# Patient Record
Sex: FEMALE | Race: WHITE | URBAN - METROPOLITAN AREA
[De-identification: names, ages, dates, MRNs, and addresses within clinical notes are randomized per-mention and may not be internally consistent; named-entity substitution may affect disease eponyms.]

---

## 2021-04-16 ENCOUNTER — EMERGENCY (EMERGENCY)
Facility: HOSPITAL | Age: 26
LOS: 1 days | Discharge: LEFT BEFORE DISCHARGE | End: 2021-04-16
Admitting: EMERGENCY MEDICINE
Payer: COMMERCIAL

## 2021-04-16 VITALS
HEART RATE: 73 BPM | DIASTOLIC BLOOD PRESSURE: 78 MMHG | HEIGHT: 62 IN | OXYGEN SATURATION: 99 % | SYSTOLIC BLOOD PRESSURE: 117 MMHG | WEIGHT: 119.93 LBS | TEMPERATURE: 98 F

## 2021-04-16 DIAGNOSIS — G43.909 MIGRAINE, UNSPECIFIED, NOT INTRACTABLE, WITHOUT STATUS MIGRAINOSUS: ICD-10-CM

## 2021-04-16 DIAGNOSIS — Z88.1 ALLERGY STATUS TO OTHER ANTIBIOTIC AGENTS STATUS: ICD-10-CM

## 2021-04-16 DIAGNOSIS — R20.2 PARESTHESIA OF SKIN: ICD-10-CM

## 2021-04-16 DIAGNOSIS — R25.2 CRAMP AND SPASM: ICD-10-CM

## 2021-04-16 PROCEDURE — 99284 EMERGENCY DEPT VISIT MOD MDM: CPT

## 2021-04-16 RX ORDER — METOCLOPRAMIDE HCL 10 MG
10 TABLET ORAL ONCE
Refills: 0 | Status: DISCONTINUED | OUTPATIENT
Start: 2021-04-16 | End: 2021-04-20

## 2021-04-16 RX ORDER — ACETAMINOPHEN 500 MG
975 TABLET ORAL ONCE
Refills: 0 | Status: DISCONTINUED | OUTPATIENT
Start: 2021-04-16 | End: 2021-04-20

## 2021-04-16 RX ORDER — SODIUM CHLORIDE 9 MG/ML
1000 INJECTION INTRAMUSCULAR; INTRAVENOUS; SUBCUTANEOUS ONCE
Refills: 0 | Status: DISCONTINUED | OUTPATIENT
Start: 2021-04-16 | End: 2021-04-20

## 2021-04-16 NOTE — ED ADULT NURSE REASSESSMENT NOTE - NS ED NURSE REASSESS COMMENT FT1
At time of nursing assessment, pt declines bloodwork, medications, radiology. Requesting to go home, reports that headache is not very severe. PA Colon aware. A&Ox4.

## 2021-04-16 NOTE — ED PROVIDER NOTE - CLINICAL SUMMARY MEDICAL DECISION MAKING FREE TEXT BOX
25 y/o F presents to ED c/o intermittent headaches x 3 days.  Pt well appearing, VSS, Neuros intacts.  VSS, NAD.  Pt refused CT with concerns of insurance costs.  She later refused labs and meds.  Pt eloped prior to further discussion or discharge.

## 2021-04-16 NOTE — ED PROVIDER NOTE - OBJECTIVE STATEMENT
25 y/o F with PMH of migraines on Butab presents to ED c/o intermittent midline and R sided frontal headache with associated cramping to bilat knees, tinging to bilat upper legs and occasional  feeling of the L small finger feeling cold.  She received the Geovanny and Geovanny vaccination last week and came to ED for further evaluation.  She took Tylenol for her headache with no relief.  Pt is on OCPs.  Pt denies trauma/falls, fevers/chills, neck or back pain, visual changes, sore throat, chest pain, palpitations, cough, SOB, abd pain, n/v/d, dysuria, hematuria, weakness, dizziness, numbness, lower extremity swelling, rash, sick contacts, recent hospitalizations, recent travels.

## 2021-04-16 NOTE — ED PROVIDER NOTE - PATIENT PORTAL LINK FT
You can access the FollowMyHealth Patient Portal offered by [facility] by registering at the following website: [email or web address]. By joining [employer]’s FollowMyHealth portal, you will also be able to view your health information using other applications (apps) compatible with our system.

## 2021-04-16 NOTE — ED ADULT TRIAGE NOTE - CHIEF COMPLAINT QUOTE
Pt c/o migraine HA x3 days, received J&J vaccine 1week PTA. Denies CP or SOB, no leg swelling, +migraines in the past.  +BCP, non-smoker, no recent travel.  Pt has been taking OTC meds without relief. "   it is not my normal migraine pain"

## 2021-04-16 NOTE — ED ADULT NURSE NOTE - OBJECTIVE STATEMENT
26y female presents to ED c/o headache. At time of nursing assessment, pt states, "I just have a headache and was nervous after getting the Geovanny and Geovanny vaccine last week. It's not bad though, and I just want to go home." A&Ox4.